# Patient Record
(demographics unavailable — no encounter records)

---

## 2025-06-10 NOTE — HISTORY OF PRESENT ILLNESS
[FreeTextEntry1] :  Patient presents today for evaluation of left ring finger. He injured it on 6/7 while playing cricket. He presented to urgent care where xrays were obtained and significant for a finger fracture, per the patient. He was placed in an allumafoam splint and told to follow up with a hand specialist. Today, he denies any pain at rest but admits to pain with palpation, edema and stiffness. Pain is primarily located at the LRF DIP but also reports mild pain at the PIP and over the dorsal metacarpal. He denies any other complaints.  Works as a physical therapist at [employer]. Had a mallet fracture on his right hand treated with splinting last year.

## 2025-06-10 NOTE — REASON FOR VISIT
[Consultation] : a consultation visit [FreeTextEntry1] : left ring finger injury  Complex Repair Preamble Text (Leave Blank If You Do Not Want): Extensive wide undermining was performed.

## 2025-06-10 NOTE — ASSESSMENT
[FreeTextEntry1] :   PE: Patient is alert, oriented and in no acute distress. Affect and general appearance are normal and patient is able to answer questions appropriately. On the LUE, there is full painless range of motion of the elbow and wrist. LRF edema, including some edema/ecchymosis over the ring finger metacarpal. Extensor lag present at left ring finger DIP with inability to extend DIP. Minimally tender to palpation. PIP joint stable to radial and ulnar stress. Mild tenderness over RCL, UCL, volar plate.  Neurologic: Median, ulnar, and radial motor and sensory are intact.  Skin: No cyanosis, clubbing, edema or rashes.  Vascular: Radial pulses intact.  Lymphatic: No streaking or epitrochlear adenopathy.     Data: 3 view xray of the left ring finger reviewed and significant for mallet fracture of the distal phalanx, minimally displaced.   Assessment: 35 year old male presents with a left ring finger bony mallet injury. Operative and nonoperative treatment options were discussed and patient opted for nonoperative management. Patient will be splinted for 6-8 weeks. The patient is aware that the splint must be worn full time in order for nonoperative treatment to be successful. It is likely that there will be a residual 10 degree extensor lag. A prescription has been provided for a custom thermoplastic mallet splint.  Plan: - Follow up 6 weeks after receiving the custom splint - No strenuous activities or heavy lifting - Work note provided